# Patient Record
Sex: MALE | Race: WHITE
[De-identification: names, ages, dates, MRNs, and addresses within clinical notes are randomized per-mention and may not be internally consistent; named-entity substitution may affect disease eponyms.]

---

## 2020-02-04 ENCOUNTER — HOSPITAL ENCOUNTER (EMERGENCY)
Dept: HOSPITAL 75 - ER FS | Age: 70
Discharge: HOME | End: 2020-02-04
Payer: MEDICARE

## 2020-02-04 VITALS — SYSTOLIC BLOOD PRESSURE: 120 MMHG | DIASTOLIC BLOOD PRESSURE: 74 MMHG

## 2020-02-04 VITALS — WEIGHT: 146.61 LBS | BODY MASS INDEX: 21.71 KG/M2 | HEIGHT: 68.9 IN

## 2020-02-04 DIAGNOSIS — J20.9: Primary | ICD-10-CM

## 2020-02-04 DIAGNOSIS — F17.210: ICD-10-CM

## 2020-02-04 LAB
ALBUMIN SERPL-MCNC: 4.1 GM/DL (ref 3.2–4.5)
ALP SERPL-CCNC: 46 U/L (ref 40–136)
ALT SERPL-CCNC: 12 U/L (ref 0–55)
APTT BLD: 27 SEC (ref 24–35)
APTT PPP: YELLOW S
BACTERIA #/AREA URNS HPF: NEGATIVE /HPF
BASOPHILS # BLD AUTO: 0.1 10^3/UL (ref 0–0.1)
BASOPHILS NFR BLD AUTO: 1 % (ref 0–10)
BILIRUB SERPL-MCNC: 0.2 MG/DL (ref 0.1–1)
BILIRUB UR QL STRIP: NEGATIVE
BUN/CREAT SERPL: 15
CALCIUM SERPL-MCNC: 8.6 MG/DL (ref 8.5–10.1)
CHLORIDE SERPL-SCNC: 100 MMOL/L (ref 98–107)
CO2 SERPL-SCNC: 23 MMOL/L (ref 21–32)
CREAT SERPL-MCNC: 0.85 MG/DL (ref 0.6–1.3)
EOSINOPHIL # BLD AUTO: 0.1 10^3/UL (ref 0–0.3)
EOSINOPHIL NFR BLD AUTO: 1 % (ref 0–10)
ERYTHROCYTE [DISTWIDTH] IN BLOOD BY AUTOMATED COUNT: 13.7 % (ref 10–14.5)
FIBRINOGEN PPP-MCNC: CLEAR MG/DL
GFR SERPLBLD BASED ON 1.73 SQ M-ARVRAT: > 60 ML/MIN
GLUCOSE SERPL-MCNC: 110 MG/DL (ref 70–105)
GLUCOSE UR STRIP-MCNC: NEGATIVE MG/DL
HCT VFR BLD CALC: 35 % (ref 40–54)
HGB BLD-MCNC: 11.5 G/DL (ref 13.3–17.7)
INR PPP: 1 (ref 0.8–1.4)
KETONES UR QL STRIP: NEGATIVE
LEUKOCYTE ESTERASE UR QL STRIP: NEGATIVE
LYMPHOCYTES # BLD AUTO: 1.8 X 10^3 (ref 1–4)
LYMPHOCYTES NFR BLD AUTO: 13 % (ref 12–44)
MANUAL DIFFERENTIAL PERFORMED BLD QL: NO
MCH RBC QN AUTO: 31 PG (ref 25–34)
MCHC RBC AUTO-ENTMCNC: 33 G/DL (ref 32–36)
MCV RBC AUTO: 96 FL (ref 80–99)
MONOCYTES # BLD AUTO: 1.2 X 10^3 (ref 0–1)
MONOCYTES NFR BLD AUTO: 9 % (ref 0–12)
NEUTROPHILS # BLD AUTO: 10.5 X 10^3 (ref 1.8–7.8)
NEUTROPHILS NFR BLD AUTO: 76 % (ref 42–75)
NITRITE UR QL STRIP: NEGATIVE
PH UR STRIP: 6.5 [PH] (ref 5–9)
PLATELET # BLD: 235 10^3/UL (ref 130–400)
PMV BLD AUTO: 11 FL (ref 7.4–10.4)
POTASSIUM SERPL-SCNC: 4.1 MMOL/L (ref 3.6–5)
PROT SERPL-MCNC: 7 GM/DL (ref 6.4–8.2)
PROT UR QL STRIP: NEGATIVE
PROTHROMBIN TIME: 13.6 SEC (ref 12.2–14.7)
RBC #/AREA URNS HPF: (no result) /HPF
SODIUM SERPL-SCNC: 135 MMOL/L (ref 135–145)
SP GR UR STRIP: 1.02 (ref 1.02–1.02)
SQUAMOUS #/AREA URNS HPF: (no result) /HPF
WBC # BLD AUTO: 13.3 10^3/UL (ref 4.3–11)
WBC #/AREA URNS HPF: (no result) /HPF

## 2020-02-04 PROCEDURE — 83880 ASSAY OF NATRIURETIC PEPTIDE: CPT

## 2020-02-04 PROCEDURE — 87040 BLOOD CULTURE FOR BACTERIA: CPT

## 2020-02-04 PROCEDURE — 83605 ASSAY OF LACTIC ACID: CPT

## 2020-02-04 PROCEDURE — 71045 X-RAY EXAM CHEST 1 VIEW: CPT

## 2020-02-04 PROCEDURE — 81000 URINALYSIS NONAUTO W/SCOPE: CPT

## 2020-02-04 PROCEDURE — 87804 INFLUENZA ASSAY W/OPTIC: CPT

## 2020-02-04 PROCEDURE — 84484 ASSAY OF TROPONIN QUANT: CPT

## 2020-02-04 PROCEDURE — 93005 ELECTROCARDIOGRAM TRACING: CPT

## 2020-02-04 PROCEDURE — 85730 THROMBOPLASTIN TIME PARTIAL: CPT

## 2020-02-04 PROCEDURE — 85025 COMPLETE CBC W/AUTO DIFF WBC: CPT

## 2020-02-04 PROCEDURE — 85610 PROTHROMBIN TIME: CPT

## 2020-02-04 PROCEDURE — 87088 URINE BACTERIA CULTURE: CPT

## 2020-02-04 PROCEDURE — 36415 COLL VENOUS BLD VENIPUNCTURE: CPT

## 2020-02-04 PROCEDURE — 80053 COMPREHEN METABOLIC PANEL: CPT

## 2020-02-04 NOTE — DIAGNOSTIC IMAGING REPORT
INDICATION: Fall



COMPARISON: None available



TECHNIQUE: Single radiograph of the chest dated 02/04/2020



FINDINGS: The cardiac silhouette and pulmonary vasculature within

normal limits. The lungs are clear. No pleural effusion. No

pneumothorax. No acute osseous abnormality.



IMPRESSION: No acute cardiopulmonary abnormality.



Dictated by: 



  Dictated on workstation # [id number]

## 2020-02-04 NOTE — ED FEVER
History of Present Illness


General


Chief Complaint:  Trauma-Non Activation


Stated Complaint:  FELL


Source:  patient, EMS


Exam Limitations:  clinical condition


 (JACOB MONTELONGO DO)





History of Present Illness


Date Seen by Provider:  Feb 4, 2020


Time Seen by Provider:  17:20


Initial Comments


The patient is a 69-year-old male presents via EMS for evaluation of fever, 

general malaise, cough, and jaundice. The patient is an extremely poor 

historian. He states he has not seen a physician in over 20 years. He smoked 4 

packs of cigarettes daily until recently when he cut back to 1 pack a day. He 

states that he used to drink alcohol heavily but does not anymore and is not 

sure if he has ever been diagnosed with cirrhosis or liver failure. He has a 

temperature of 37.7F upon arrival. He is noted to be tachycardic. He denies any

pain at this time. He is alert, somewhat anxious, but appears to be in no 

distress.


Timing/Duration:  other (unknown)


Fever Quality:  greater than 100.5 F


Associated Symptoms:  cough (JACOB MONTELONGO DO)





Allergies and Home Medications


Allergies


Coded Allergies:  


     No Known Drug Allergies (Unverified , 2/4/20)





Patient Home Medication List


Home Medication List Reviewed:  Yes


 (JACOB MONTELONGO DO)





Review of Systems


Review of Systems


Constitutional:  fever


EENTM:  no symptoms reported


Respiratory:  cough


Cardiovascular:  no symptoms reported


Gastrointestinal:  No abdominal pain; nausea


Genitourinary:  no symptoms reported


Musculoskeletal:  no symptoms reported


Skin:  no symptoms reported


Psychiatric/Neurological:  No Symptoms Reported


Hematologic/Lymphatic:  No Symptoms Reported


Immunological/Allergic:  no symptoms reported (JACOB MONTELONGO DO)





All Other Systems Reviewed


Negative Unless Noted:  Yes


 (JACOB MONTELONGO DO)





Past Medical-Social-Family Hx


Past Med/Social Hx:  Reviewed Nursing Past Med/Soc Hx


 (JACOB MONTELONGO DO)





Physical Exam





Vital Signs - First Documented








 2/4/20 2/4/20





 17:19 17:33


 


Temp  37.7


 


Pulse  119


 


Resp  18


 


B/P (MAP)  113/67 (82)


 


Pulse Ox  94


 


O2 Delivery Nasal Cannula 


 


O2 Flow Rate 2.00 


 


FiO2 94 








 (ESPINOZA MURRIETA MD)


Capillary Refill :  


 (JACOB MONTELONGO DO)


Height: '"


Weight: lbs. oz. kg;  BMI


Method:


General Appearance:  WD/WN, no apparent distress


Eyes:  Bilateral Eye PERRL, Bilateral Eye EOMI, Bilateral Eye Scleral Icterus


HEENT:  PERRL/EOMI, pharynx normal


Neck:  non-tender, full range of motion, supple


Respiratory:  chest non-tender, lungs clear, normal breath sounds, no 

respiratory distress, no accessory muscle use


Cardiovascular:  no edema, no JVD, tachycardia


Gastrointestinal:  normal bowel sounds, non tender, soft


Extremities:  normal range of motion, non-tender, normal inspection, no pedal 

edema


Neurologic/Psychiatric:  alert, normal mood/affect, oriented x 3


Skin:  warm/dry, jaundice (JACOB MONTELONGO DO)





Focused Exam


Lactate Level


2/4/20 17:20: Lactic Acid Level 1.80


 (ESPINOZA MURRIETA MD)


Lactic Acid Level





Laboratory Tests








Test


 2/4/20


17:20


 


Lactic Acid Level


 1.80 MMOL/L


(0.50-2.00)





 (ESPINOZA MURRIETA MD)





Progress/Results/Core Measures


Suspected Sepsis


SIRS


Temperature: 


Pulse:  


Respiratory Rate: 


 


Laboratory Tests


2/4/20 17:20: 


Blood Pressure  / 


Mean: 


 





2/4/20 17:20: 








Laboratory Tests


2/4/20 17:20: 


 (JACOB MONTELONGO DO)





Results/Orders


Lab Results





Laboratory Tests








Test


 2/4/20


17:20 2/4/20


18:12 Range/Units


 


 


White Blood Count


 13.3 H


 


 4.3-11.0


10^3/uL


 


Red Blood Count


 3.67 L


 


 4.35-5.85


10^6/uL


 


Hemoglobin 11.5 L  13.3-17.7  G/DL


 


Hematocrit 35 L  40-54  %


 


Mean Corpuscular Volume 96   80-99  FL


 


Mean Corpuscular Hemoglobin 31   25-34  PG


 


Mean Corpuscular Hemoglobin


Concent 33 


 


 32-36  G/DL





 


Red Cell Distribution Width 13.7   10.0-14.5  %


 


Platelet Count


 235 


 


 130-400


10^3/uL


 


Mean Platelet Volume 11.0 H  7.4-10.4  FL


 


Neutrophils (%) (Auto) 76 H  42-75  %


 


Lymphocytes (%) (Auto) 13   12-44  %


 


Monocytes (%) (Auto) 9   0-12  %


 


Eosinophils (%) (Auto) 1   0-10  %


 


Basophils (%) (Auto) 1   0-10  %


 


Neutrophils # (Auto) 10.5 H  1.8-7.8  X 10^3


 


Lymphocytes # (Auto) 1.8   1.0-4.0  X 10^3


 


Monocytes # (Auto) 1.2 H  0.0-1.0  X 10^3


 


Eosinophils # (Auto)


 0.1 


 


 0.0-0.3


10^3/uL


 


Basophils # (Auto)


 0.1 


 


 0.0-0.1


10^3/uL


 


Prothrombin Time 13.6   12.2-14.7  SEC


 


INR Comment 1.0   0.8-1.4  


 


Activated Partial


Thromboplast Time 27 


 


 24-35  SEC





 


Sodium Level 135   135-145  MMOL/L


 


Potassium Level 4.1   3.6-5.0  MMOL/L


 


Chloride Level 100     MMOL/L


 


Carbon Dioxide Level 23   21-32  MMOL/L


 


Anion Gap 12   5-14  MMOL/L


 


Blood Urea Nitrogen 13   7-18  MG/DL


 


Creatinine


 0.85 


 


 0.60-1.30


MG/DL


 


Estimat Glomerular Filtration


Rate > 60 


 


  





 


BUN/Creatinine Ratio 15    


 


Glucose Level 110 H    MG/DL


 


Lactic Acid Level


 1.80 


 


 0.50-2.00


MMOL/L


 


Calcium Level 8.6   8.5-10.1  MG/DL


 


Corrected Calcium 8.5   8.5-10.1  MG/DL


 


Total Bilirubin 0.2   0.1-1.0  MG/DL


 


Aspartate Amino Transf


(AST/SGOT) 20 


 


 5-34  U/L





 


Alanine Aminotransferase


(ALT/SGPT) 12 


 


 0-55  U/L





 


Alkaline Phosphatase 46     U/L


 


Troponin I < 0.30   <0.30  NG/ML


 


Pro-B-Type Natriuretic Peptide 189.3 H  <75.0  PG/ML


 


Total Protein 7.0   6.4-8.2  GM/DL


 


Albumin 4.1   3.2-4.5  GM/DL


 


Urine Color  YELLOW   


 


Urine Clarity  CLEAR   


 


Urine pH  6.5  5-9  


 


Urine Specific Gravity  1.020  1.016-1.022  


 


Urine Protein  NEGATIVE  NEGATIVE  


 


Urine Glucose (UA)  NEGATIVE  NEGATIVE  


 


Urine Ketones  NEGATIVE  NEGATIVE  


 


Urine Nitrite  NEGATIVE  NEGATIVE  


 


Urine Bilirubin  NEGATIVE  NEGATIVE  


 


Urine Urobilinogen  0.2  < = 1.0  MG/DL


 


Urine Leukocyte Esterase  NEGATIVE  NEGATIVE  


 


Urine RBC (Auto)  TRACE H NEGATIVE  


 


Urine RBC  RARE   /HPF


 


Urine WBC  RARE   /HPF


 


Urine Squamous Epithelial


Cells 


 RARE 


  /HPF





 


Urine Crystals  NONE   /LPF


 


Urine Bacteria  NEGATIVE   /HPF


 


Urine Casts  NONE   /LPF


 


Urine Mucus  NONE   /LPF


 


Urine Culture Indicated  NO   





 (ESPINOZA MURRIETA MD)


Micro Results





Microbiology


2/4/20 Influenza Types A,B Antigen (RODOLFO) - Final, Complete


         


 (ESPINOZA MURRIETA MD)


My Orders





Orders - ESPINOZA MURRIETA MD


Rocephin 1 Gm Iv (1x Dose) (2/4/20 19:15)


Methylprednisolone Sod Succ (Solu-Medrol (2/4/20 19:15)


Albuterol/Ipra Inhalation Soln (Duoneb I (2/4/20 19:15)


Svn Small Volume Nebulizer (2/4/20 19:06)


 (ESPINOZA MURRIETA MD)


Vital Signs/I&O











 2/4/20 2/4/20





 17:19 17:33


 


Temp  37.7


 


Pulse  119


 


Resp  18


 


B/P (MAP)  113/67 (82)


 


Pulse Ox  94


 


O2 Delivery Nasal Cannula Room Air


 


O2 Flow Rate 2.00 


 


FiO2 94 








 (ESPINOZA MURRIETA MD)


Vital Signs/I&O


Capillary Refill :  


 (JACOB MONTELONGO DO)


Progress Note :  


Progress Note


@1800 - Pt care transferred to Dr. DARINEL Murrieta at this time. Awaiting laboratory 

results and response to treatment.


 (JACOB MONTELONGO DO)


Progress Note :  


   Time:  19:07


Progress Note


1905: Patient resting comfortably on side of bed.  Test results discussed with 

patient and family.  I strongly encouraged admission to the hospital.  He 

refused.  He understands risk including death.


 (ESPINOZA MURRIETA MD)


ECG


EKG :  


Comment


@1726 - Sinus tachycardia, rate 1:15, normal axis, no acute ischemic findings 

noted, no STEMI, reviewed and interpreted by myself


 (JACOB MONTELONGO DO)





Departure


Impression





   Primary Impression:  


   Acute bronchitis


Disposition:  01 HOME, SELF-CARE


Condition:  Stable





Departure-Patient Inst.


Decision time for Depature:  19:09


 (ESPINOZA MURRIETA MD)


Patient Instructions:  Acute Bronchitis, Adult (DC)





Add. Discharge Instructions:  


Follow-up with physician of your choice as soon as possible.  They can about 

axis prescribed.  Try to quit smoking.





All discharge instructions reviewed with patient and/or family. Voiced 

understanding.


Scripts


Prednisone (Prednisone) 20 Mg Tab


20 MG PO BID, #10 TAB


   Take 3 tabs(60mg)daily, decrease by 1/2 tab(10mg)daily.


   Prov: ESPINOZA MURRIETA MD         2/4/20 


Albuterol Sulfate (Proventil Hfa) 6.7 Gm Hfa.aer.ad


2 PUFF INH Q6H for SHORTNESS OF BREATH, #1 EACH


   Prov: ESPINOZA MURRIETA MD         2/4/20 


Azithromycin (Azithromycin) 250 Mg Tablet


250 MG PO UD, #6 TAB


   TAKE 2 TABLETS ON DAY ONE THEN TAKE 1 TABLET DAILY FOR FOUR MORE DAYS


   Prov: ESPINOZA MURRIETA MD         2/4/20











JACOB MONTELONGO DO               Feb 4, 2020 17:30


ESPINOZA MURRIETA MD               Feb 4, 2020 19:12